# Patient Record
Sex: MALE | Race: BLACK OR AFRICAN AMERICAN | URBAN - METROPOLITAN AREA
[De-identification: names, ages, dates, MRNs, and addresses within clinical notes are randomized per-mention and may not be internally consistent; named-entity substitution may affect disease eponyms.]

---

## 2017-06-01 ENCOUNTER — HISTORICAL (OUTPATIENT)
Dept: RADIOLOGY | Facility: HOSPITAL | Age: 67
End: 2017-06-01

## 2017-06-13 ENCOUNTER — HISTORICAL (OUTPATIENT)
Dept: RADIOLOGY | Facility: HOSPITAL | Age: 67
End: 2017-06-13

## 2021-06-30 ENCOUNTER — HISTORICAL (OUTPATIENT)
Dept: SURGERY | Facility: HOSPITAL | Age: 71
End: 2021-06-30

## 2022-04-29 NOTE — OP NOTE
DATE OF SURGERY:        SURGEON:  Fawad Zhang MD    PREOPERATIVE DIAGNOSIS:  Carpal tunnel, right wrist.    POSTOPERATIVE DIAGNOSIS:  Carpal tunnel, right wrist.    PROCEDURE PERFORMED:  Carpal tunnel release.    ANESTHESIA:  Axillary block.    ESTIMATED BLOOD LOSS:  Minimal.    DESCRIPTION OF PROCEDURE:  In the operating suite under axillary block anesthetic, the right arm was prepped and draped in a sterile fashion.  The arm was exsanguinated and tourniquet inflated to 250 mmHg.  The right palm was explored through a small proximal curved incision.  The skin and subcutaneous tissue were incised.  Bleeding was controlled with Bovie cautery.  The median nerve was identified at the level of the distal wrist crease, and the carpal ligament transected on the ulnar border of the canal throughout the entire length of the canal using a 69 Carmen blade.  During dissection, care was taken to identify and preserve the recurrent motor branch.  I tunneled proximally in the distal forearm and released the subcutaneous fascia.  The epineurium surrounding the nerve was gently teased with fine pickups and forceps.  Once the nerve was completely freed, the tourniquet was released, and final hemostasis was obtained.  The skin incision was closed with vertical mattress sutures of 5-0 and 6-0 Prolene.  A sterile splint dressing was then applied and the procedure terminated.        ______________________________  MD BERNICE Peacock/SK  DD:  06/30/2021  Time:  02:10PM  DT:  06/30/2021  Time:  02:20PM  Job #:  [id number]    cc: Elma Combs MD